# Patient Record
Sex: MALE | Race: OTHER | HISPANIC OR LATINO | URBAN - METROPOLITAN AREA
[De-identification: names, ages, dates, MRNs, and addresses within clinical notes are randomized per-mention and may not be internally consistent; named-entity substitution may affect disease eponyms.]

---

## 2021-09-23 ENCOUNTER — EMERGENCY (EMERGENCY)
Facility: HOSPITAL | Age: 31
LOS: 1 days | Discharge: HOME | End: 2021-09-23
Attending: EMERGENCY MEDICINE | Admitting: EMERGENCY MEDICINE
Payer: SELF-PAY

## 2021-09-23 VITALS
DIASTOLIC BLOOD PRESSURE: 74 MMHG | HEIGHT: 65 IN | RESPIRATION RATE: 16 BRPM | SYSTOLIC BLOOD PRESSURE: 113 MMHG | OXYGEN SATURATION: 99 % | WEIGHT: 186.95 LBS | HEART RATE: 72 BPM | TEMPERATURE: 98 F

## 2021-09-23 PROCEDURE — 72100 X-RAY EXAM L-S SPINE 2/3 VWS: CPT | Mod: 26

## 2021-09-23 PROCEDURE — 99284 EMERGENCY DEPT VISIT MOD MDM: CPT

## 2021-09-23 PROCEDURE — 99283 EMERGENCY DEPT VISIT LOW MDM: CPT | Mod: 25

## 2021-09-23 PROCEDURE — 72100 X-RAY EXAM L-S SPINE 2/3 VWS: CPT

## 2021-09-23 RX ORDER — IBUPROFEN 200 MG
600 TABLET ORAL ONCE
Refills: 0 | Status: COMPLETED | OUTPATIENT
Start: 2021-09-23 | End: 2021-09-23

## 2021-09-23 RX ORDER — LIDOCAINE 4 G/100G
1 CREAM TOPICAL ONCE
Refills: 0 | Status: COMPLETED | OUTPATIENT
Start: 2021-09-23 | End: 2021-09-23

## 2021-09-23 RX ADMIN — Medication 600 MILLIGRAM(S): at 15:33

## 2021-09-23 RX ADMIN — LIDOCAINE 1 PATCH: 4 CREAM TOPICAL at 15:39

## 2021-09-23 NOTE — ED ADULT NURSE NOTE - CHIEF COMPLAINT QUOTE
I was carrying [employer] board and my colleague let go of board because wind and the board dropped on my colleague and myself

## 2021-09-23 NOTE — ED ADULT NURSE NOTE - OBJECTIVE STATEMENT
Pt was at work when he was holding a [employer] board - one of his colleagues dropped the board causing it to fall on his back

## 2021-09-23 NOTE — ED PROVIDER NOTE - OBJECTIVE STATEMENT
Pt is a 29 yo male with no pmhx here for low back pain s/p plywood fell on pt while at work. Pt states he and his workers were carrying large amount of plywood and strong flora of wind came and knocked them out of his hand and they hit his back denies any loc head injury nvd dizziness weakness fall numbness tingling chest pain sob.

## 2021-09-23 NOTE — ED PROVIDER NOTE - NSFOLLOWUPINSTRUCTIONS_ED_ALL_ED_FT
Follow up with orthopedics  return to er for any worsening symptoms  take Motrin for pain     Dolor aminta de la parte inferior de la espalda    CUIDADO AMBULATORIO:    El dolor aminta de la región lumbar de la espaldaes maria molestia repentina que dura hasta 6 semanas y que dificulta la actividad.    Los síntomas más comunes incluyen los siguientes:  •Rigidez o espasmos      •Dolor hacia abajo o en un lado de blankenship pierna      •Mantenerse en maria posición o postura inusual para disminuir el dolor en blankenship espalda      •No poder encontrar maria posición cómoda mientras está sentado      •Poco a poco aumento del dolor por 24 o 48 horas después de ejercer tensión en blankenship espalda      •Molestia en el lumbago o dolor intenso cuando mueve blankenship espalda      Busque atención médica de inmediato si:  •Usted tiene dolor intenso.      •Usted repentinamente tiene rigidez o siente pesadez en ambos glúteos hacia abajo de ambas piernas.      •Usted tiene entumecimiento o debilidad en maria pierna o dolor en ambas piernas.      •Usted tiene entumecimiento en el área genital o en la región lumbar.      •Usted no puede controlar blankenship orina ni consuelo deposiciones intestinales.      Llame a blankenship médico si:  •Tiene fiebre.      •Usted tiene un dolor por la noche o cuando descansa.      •Blankenship dolor no mejora con el tratamiento.      •Usted tiene dolor que empeora cuando tose o estornuda.      •Usted siente un estallido o chasquido repentino en blankenship espalda.      •Usted tiene preguntas o inquietudes acerca de blankenship condición o cuidado.      La meta del tratamiento para el dolor de la parte inferior de la espaldaes aliviar el dolor y ayudarlo para que pueda realizar consuelo actividades habituales. La mayoría de las personas que tienen dolor aminta de la parte inferior de la espalda se mejoran entre unas 4 a 6 semanas. Es posible que usted necesite alguno de los siguientes:  •Los BENEDICT,mazin el ibuprofeno, ayudan a disminuir la inflamación, el dolor y la fiebre. Skyla medicamento está disponible con o sin maria receta médica. Los BENEDICT pueden causar sangrado estomacal o problemas renales en ciertas personas. Si usted jacque un medicamento anticoagulante, siempre pregúntele a blankenship médico si los BENEDICT son seguros para usted. Siempre maria g la etiqueta de skyla medicamento y siga las instrucciones.      •Acetaminofénalivia el dolor y baja la fiebre. Está disponible sin receta médica. Pregunte la cantidad y la frecuencia con que debe tomarlos. Siga las indicaciones. Maria G las etiquetas de todos los demás medicamentos que esté usando para saber si también contienen acetaminofén, o pregunte a blankenship médico o farmacéutico. El acetaminofén puede causar daño en el hígado cuando no se jacque de forma correcta. No use más de 4 gramos (4000 miligramos) en total de acetaminofeno en un día.      •Relajantes muscularesdisminuyen el dolor y relajan los músculos de la parte inferior de la columna.      •Puede administrarsepodrían administrarse. Pregunte al médico cómo debe jo skyla medicamento de forma arellano. Algunos medicamentos recetados para el dolor contienen acetaminofén. No tome otros medicamentos que contengan acetaminofén sin consultarlo con blankenship médico. Demasiado acetaminofeno puede causar daño al hígado. Los medicamentos recetados para el dolor podrían causar estreñimiento. Pregunte a blankenship médico mazin prevenir o tratar estreñimiento.      El manejo de consuelo síntomas:  •Manténgase activolo más que pueda sin causar más dolor. El reposo en cama puede empeorar blankenship dolor de espalda. Comience con ejercicios ligeros mazin caminar. Evite levantar objetos hasta que ya no tenga dolor. Solicite más información acerca de las actividades físicas o plan de ejercicios que son los adecuados para usted.  Andrea diversa caminando mazin ejercicio           •Aplique caloren la espalda de 20 a 30 minutos cada 2 horas por los días que le indiquen. El calor ayuda a disminuir el dolor y los espasmos musculares. Alterne entre el calor y el hielo.      •Aplique hieloen la espalda de 15 a 20 minutos cada hora o mazin se le indique. Use maria compresa de hielo o ponga hielo triturado en maria bolsa de plástico. Cúbralo con maria toalla antes de aplicarlo sobre blankenship piel. El hielo ayuda a evitar daño al tejido y a disminuir la inflamación y el dolor.      Prevenir el dolor aminta de la parte inferior de la espalda:  •Use la mecánica corporal adecuada.?Flexione la cadera y las rodillas cuando vaya a levantar un objeto. No doble la cintura. Utilice los músculos de las piernas mientras levanta blankenship carga. No use blankenship espalda. Mantenga el objeto cerca de blankenship pecho mientras lo levanta. No se tuerza, ni levante cualquier cosa por encima de blankenship cintura.      ?Cambie blankenship posición frecuentemente cuando pase mucho tiempo de pie. Descanse un pie sobre maria caja pequeña o un reposapiés e intercambie con el otro pie frecuentemente.      ?No permanezca sentado por lapsos de tiempo prolongados. Cuando sea necesario hacerlo, siéntese en maria silla de respaldo recto con los pies apoyados en el suelo. Nunca alcance, jale ni empuje mientras se encuentra sentando.      •Wolf ejercicios que fortalezcan consuelo músculos de la espalda.Entre en calor antes de hacer ejercicio. Consulte con blankenship médico sobre el mejor plan de ejercicios para usted.      •Mantenga un peso saludable.Pregúntele a blankenship médico cuál es el peso ideal para usted. Pídale que lo ayude a crear un plan para bajar de peso si tiene sobrepeso.      Acuda a la consulta de control con blankenship médico según las indicaciones:Regrese a maria cristiano de seguimiento si usted aún tiene dolor después de 1 a 3 semanas de tratamiento. Kojo vez necesite acudir con un ortopedista si blankenship dolor de espalda dura más de 12 semanas. Anote consuelo preguntas para que se acuerde de hacerlas jimmy consuelo visitas.

## 2021-09-23 NOTE — ED PROVIDER NOTE - PATIENT PORTAL LINK FT
You can access the FollowMyHealth Patient Portal offered by [facility] by registering at the following website: [email or web address]. By joining [facility]’s FollowMyHealth portal, you will also be able to view your health information using other applications (apps) compatible with our system.

## 2021-09-23 NOTE — ED PROVIDER NOTE - ATTENDING CONTRIBUTION TO CARE
I have personally performed a face to face diagnostic evaluation on this patient.  I have reviewed the PA note and agree with the history, exam, and plan of care, except as noted.  History and Exam by me shows  low back pain today.   [id number].  A wooden table blown by wind and hit his lower back pain..  pt is in nad.  back- nt, from, nt. back contusion.

## 2021-09-23 NOTE — ED PROVIDER NOTE - CARE PROVIDER_API CALL
Igor Klein  ORTHOPAEDIC SURGERY  [address] [facility], [address] [place] [address]  Phone: [phone]  Fax: [phone]  Follow Up Time:

## 2021-09-23 NOTE — ED ADULT TRIAGE NOTE - CHIEF COMPLAINT QUOTE
I was carrying [employer] board and my colleague let go of board because wind and the board dropped on my colleague and myself